# Patient Record
Sex: FEMALE | Race: WHITE | NOT HISPANIC OR LATINO | URBAN - METROPOLITAN AREA
[De-identification: names, ages, dates, MRNs, and addresses within clinical notes are randomized per-mention and may not be internally consistent; named-entity substitution may affect disease eponyms.]

---

## 2021-11-08 ENCOUNTER — APPOINTMENT (RX ONLY)
Dept: URBAN - METROPOLITAN AREA CLINIC 116 | Facility: CLINIC | Age: 36
Setting detail: DERMATOLOGY
End: 2021-11-08

## 2021-11-08 DIAGNOSIS — D49.2 NEOPLASM OF UNSPECIFIED BEHAVIOR OF BONE, SOFT TISSUE, AND SKIN: ICD-10-CM

## 2021-11-08 PROBLEM — D23.72 OTHER BENIGN NEOPLASM OF SKIN OF LEFT LOWER LIMB, INCLUDING HIP: Status: ACTIVE | Noted: 2021-11-08

## 2021-11-08 PROCEDURE — ? COUNSELING

## 2021-11-08 PROCEDURE — 11103 TANGNTL BX SKIN EA SEP/ADDL: CPT

## 2021-11-08 PROCEDURE — 11104 PUNCH BX SKIN SINGLE LESION: CPT

## 2021-11-08 PROCEDURE — ? BIOPSY BY SHAVE METHOD

## 2021-11-08 PROCEDURE — 99202 OFFICE O/P NEW SF 15 MIN: CPT | Mod: 25

## 2021-11-08 PROCEDURE — ? BIOPSY BY PUNCH METHOD

## 2021-11-08 ASSESSMENT — LOCATION ZONE DERM
LOCATION ZONE: LEG
LOCATION ZONE: TRUNK

## 2021-11-08 ASSESSMENT — LOCATION DETAILED DESCRIPTION DERM
LOCATION DETAILED: LEFT PROXIMAL CALF
LOCATION DETAILED: LEFT LATERAL SUPERIOR CHEST

## 2021-11-08 ASSESSMENT — LOCATION SIMPLE DESCRIPTION DERM
LOCATION SIMPLE: CHEST
LOCATION SIMPLE: LEFT CALF

## 2021-11-08 NOTE — PROCEDURE: BIOPSY BY SHAVE METHOD
Detail Level: Detailed
Depth Of Biopsy: dermis
Was A Bandage Applied: Yes
Size Of Lesion In Cm: 0
Biopsy Type: H and E
Biopsy Method: Personna blade
Anesthesia Type: 1% lidocaine with epinephrine and a 1:10 solution of 8.4% sodium bicarbonate
Anesthesia Volume In Cc (Will Not Render If 0): 1
Hemostasis: Lucy's
Wound Care: Vaseline
Dressing: pressure dressing with telfa
Destruction After The Procedure: No
Type Of Destruction Used: Curettage
Curettage Text: The wound bed was treated with curettage after the biopsy was performed.
Cryotherapy Text: The wound bed was treated with cryotherapy after the biopsy was performed.
Electrodesiccation Text: The wound bed was treated with electrodesiccation after the biopsy was performed.
Electrodesiccation And Curettage Text: The wound bed was treated with electrodesiccation and curettage after the biopsy was performed.
Silver Nitrate Text: The wound bed was treated with silver nitrate after the biopsy was performed.
Lab: [facility]
Lab Facility: [address]
Consent: Written consent was obtained and risks were reviewed including but not limited to scarring, infection, bleeding, scabbing, incomplete removal, nerve damage and allergy to anesthesia.
Post-Care Instructions: I reviewed with the patient in detail post-care instructions. Patient is to keep the biopsy site dry overnight, and then apply bacitracin twice daily until healed. Patient may apply hydrogen peroxide soaks to remove any crusting.
Notification Instructions: Patient will be notified of biopsy results. However, patient instructed to call the office if not contacted within 2 weeks.
Billing Type: [employer]
Information: Selecting Yes will display possible errors in your note based on the variables you have selected. This validation is only offered as a suggestion for you. PLEASE NOTE THAT THE VALIDATION TEXT WILL BE REMOVED WHEN YOU FINALIZE YOUR NOTE. IF YOU WANT TO FAX A PRELIMINARY NOTE YOU WILL NEED TO TOGGLE THIS TO 'NO' IF YOU DO NOT WANT IT IN YOUR FAXED NOTE.

## 2021-11-08 NOTE — PROCEDURE: BIOPSY BY PUNCH METHOD
Detail Level: Detailed
Was A Bandage Applied: Yes
Punch Size In Mm: 2
Biopsy Type: H and E
Anesthesia Type: 1% lidocaine with epinephrine
Anesthesia Volume In Cc (Will Not Render If 0): 1
Additional Anesthesia Volume In Cc (Will Not Render If 0): 0
Hemostasis: Aluminum Chloride
Epidermal Sutures: none, closed by secondary intention
Wound Care: Vaseline
Dressing: pressure dressing
Patient Will Remove Sutures At Home?: No
Lab: [facility]
Lab Facility: [address]
Consent: Written consent was obtained and risks were reviewed including but not limited to scarring, infection, bleeding, scabbing, incomplete removal, nerve damage and allergy to anesthesia.
Post-Care Instructions: I reviewed with the patient in detail post-care instructions. Patient is to keep the biopsy site dry overnight, and then apply bacitracin twice daily until healed. Patient may apply hydrogen peroxide soaks to remove any crusting.
Home Suture Removal Text: Patient was provided a home suture removal kit and will remove their sutures at home. If they have any questions or difficulties they will call the office.
Notification Instructions: Patient will be notified of biopsy results. However, patient instructed to call the office if not contacted within 2 weeks.
Billing Type: [employer]
Information: Selecting Yes will display possible errors in your note based on the variables you have selected. This validation is only offered as a suggestion for you. PLEASE NOTE THAT THE VALIDATION TEXT WILL BE REMOVED WHEN YOU FINALIZE YOUR NOTE. IF YOU WANT TO FAX A PRELIMINARY NOTE YOU WILL NEED TO TOGGLE THIS TO 'NO' IF YOU DO NOT WANT IT IN YOUR FAXED NOTE.

## 2022-01-03 ENCOUNTER — APPOINTMENT (RX ONLY)
Dept: URBAN - METROPOLITAN AREA CLINIC 116 | Facility: CLINIC | Age: 37
Setting detail: DERMATOLOGY
End: 2022-01-03

## 2022-01-03 DIAGNOSIS — L91.0 HYPERTROPHIC SCAR: ICD-10-CM

## 2022-01-03 PROCEDURE — ? INTRALESIONAL KENALOG

## 2022-01-03 PROCEDURE — ? COUNSELING

## 2022-01-03 PROCEDURE — 11900 INJECT SKIN LESIONS </W 7: CPT

## 2022-01-03 ASSESSMENT — LOCATION DETAILED DESCRIPTION DERM: LOCATION DETAILED: LEFT LATERAL SUPERIOR CHEST

## 2022-01-03 ASSESSMENT — LOCATION ZONE DERM: LOCATION ZONE: TRUNK

## 2022-01-03 ASSESSMENT — LOCATION SIMPLE DESCRIPTION DERM: LOCATION SIMPLE: CHEST

## 2022-01-03 NOTE — PROCEDURE: INTRALESIONAL KENALOG
Include Z78.9 (Other Specified Conditions Influencing Health Status) As An Associated Diagnosis?: No
X Size Of Lesion In Cm (Optional): 0
Total Volume Injected (Ccs- Only Use Numbers And Decimals): 0.5
Consent: The risks of atrophy were reviewed with the patient.
Medical Necessity Clause: This procedure was medically necessary because the lesions that were treated were:
Lot # (Optional): [id number]
Ndc# For Kenalog Only: 3660-0863-36
Concentration Of Solution Injected (Mg/Ml): 10.0
Detail Level: Simple
Validate Note Data When Using Inventory: Yes
Administered By (Optional): Dillon Elena PA-C
Expiration Date (Optional): March 2023
Kenalog Preparation: Kenalog

## 2022-07-09 ENCOUNTER — TELEPHONE ENCOUNTER (OUTPATIENT)
Dept: URBAN - METROPOLITAN AREA CLINIC 121 | Facility: CLINIC | Age: 37
End: 2022-07-09

## 2022-07-09 RX ORDER — URSODIOL 300 MG/1
ONCE A DAY CAPSULE ORAL ONCE A DAY
Refills: 1 | OUTPATIENT
Start: 2016-06-08 | End: 2017-02-03

## 2022-07-09 RX ORDER — PANCRELIPASE 24000; 76000; 120000 [USP'U]/1; [USP'U]/1; [USP'U]/1
CAPSULE, DELAYED RELEASE PELLETS ORAL ONCE A DAY
Refills: 0 | OUTPATIENT
Start: 2016-05-20 | End: 2017-05-11

## 2022-07-09 RX ORDER — AZITHROMYCIN 250 MG/1
1 TAB 3 TIMES A WEEK TABLET, FILM COATED ORAL
Refills: 0 | OUTPATIENT
Start: 2016-03-30 | End: 2016-05-20

## 2022-07-09 RX ORDER — AMOXICILLIN 500 MG/1
MON WED FRI TABLET, FILM COATED ORAL
Refills: 0 | OUTPATIENT
Start: 2016-11-14 | End: 2017-05-11

## 2022-07-09 RX ORDER — AZITHROMYCIN 250 MG/1
1 TAB 3 TIMES A WEEK TABLET, FILM COATED ORAL
Refills: 0 | OUTPATIENT
Start: 2016-05-20 | End: 2016-06-08

## 2022-07-09 RX ORDER — FLUTICASONE PROPIONATE 50 UG/1
SPRAY, METERED NASAL TWICE A DAY
Refills: 0 | OUTPATIENT
Start: 2017-05-11 | End: 2018-01-04

## 2022-07-09 RX ORDER — AZTREONAM 75 MG/ML
KIT INHALATION ONCE A DAY
Refills: 0 | OUTPATIENT
Start: 2017-09-01 | End: 2018-01-04

## 2022-07-09 RX ORDER — OMEPRAZOLE 20 MG/1
TABLET, DELAYED RELEASE ORAL AS NEEDED
Refills: 0 | OUTPATIENT
Start: 2017-05-11 | End: 2018-01-04

## 2022-07-09 RX ORDER — ALBUTEROL SULFATE 2.5 MG/3ML
SOLUTION RESPIRATORY (INHALATION)
Refills: 0 | OUTPATIENT
Start: 2016-03-30 | End: 2016-05-20

## 2022-07-09 RX ORDER — FLUTICASONE PROPIONATE 50 UG/1
SPRAY, METERED NASAL ONCE A DAY
Refills: 0 | OUTPATIENT
Start: 2016-05-20 | End: 2017-05-11

## 2022-07-09 RX ORDER — ALBUTEROL SULFATE 2.5 MG/3ML
SOLUTION RESPIRATORY (INHALATION)
Refills: 0 | OUTPATIENT
Start: 2012-01-13 | End: 2016-03-30

## 2022-07-09 RX ORDER — URSODIOL 500 MG/1
TABLET ORAL ONCE A DAY
Refills: 0 | OUTPATIENT
Start: 2017-05-11 | End: 2017-06-15

## 2022-07-09 RX ORDER — AZTREONAM 75 MG/ML
KIT INHALATION THREE TIMES A DAY
Refills: 0 | OUTPATIENT
Start: 2017-05-11 | End: 2018-01-04

## 2022-07-09 RX ORDER — ALBUTEROL SULFATE 2.5 MG/3ML
SOLUTION RESPIRATORY (INHALATION) AS NEEDED
Refills: 0 | OUTPATIENT
Start: 2016-05-20 | End: 2017-05-11

## 2022-07-09 RX ORDER — FLUTICASONE PROPIONATE 50 UG/1
SPRAY, METERED NASAL
Refills: 0 | OUTPATIENT
Start: 2016-03-30 | End: 2016-05-20

## 2022-07-09 RX ORDER — FLUTICASONE PROPIONATE AND SALMETEROL 50; 250 UG/1; UG/1
POWDER RESPIRATORY (INHALATION) TWICE A DAY
Refills: 0 | OUTPATIENT
Start: 2017-05-11 | End: 2018-01-04

## 2022-07-09 RX ORDER — AZTREONAM 75 MG/ML
KIT INHALATION
Refills: 0 | OUTPATIENT
Start: 2016-03-30 | End: 2016-05-20

## 2022-07-09 RX ORDER — PANTOPRAZOLE SODIUM 40 MG/1
TABLET, DELAYED RELEASE ORAL ONCE A DAY
Refills: 0 | OUTPATIENT
Start: 2016-05-17 | End: 2016-06-08

## 2022-07-09 RX ORDER — TOBRAMYCIN 300 MG/5ML
SOLUTION ORAL; RESPIRATORY (INHALATION) TWICE A DAY
Refills: 0 | OUTPATIENT
Start: 2017-05-11 | End: 2018-01-04

## 2022-07-09 RX ORDER — SODIUM CHLORIDE 5 %
DROPS OPHTHALMIC (EYE)
Refills: 0 | OUTPATIENT
Start: 2016-03-30 | End: 2016-03-30

## 2022-07-09 RX ORDER — OMEPRAZOLE 20 MG/1
ONCE A DAY CAPSULE, DELAYED RELEASE ORAL ONCE A DAY
Refills: 3 | OUTPATIENT
Start: 2016-08-08 | End: 2016-11-14

## 2022-07-09 RX ORDER — OMEPRAZOLE 20 MG/1
TABLET, DELAYED RELEASE ORAL AS NEEDED
Refills: 0 | OUTPATIENT
Start: 2016-11-14 | End: 2017-05-11

## 2022-07-09 RX ORDER — MORPHINE SULFATE 1 MG/ML
2GM EVERY 30 MINUTESAS NEEDED INJECTION INTRAVENOUS
Refills: 0 | OUTPATIENT
Start: 2016-03-30 | End: 2016-03-30

## 2022-07-09 RX ORDER — AZTREONAM 75 MG/ML
KIT INHALATION
Refills: 0 | OUTPATIENT
Start: 2012-01-13 | End: 2016-03-30

## 2022-07-09 RX ORDER — NITROGLYCERIN 0.4 MG/1
PRN TABLET SUBLINGUAL ONCE A DAY
Refills: 0 | OUTPATIENT
Start: 2016-05-20 | End: 2016-06-08

## 2022-07-09 RX ORDER — AZITHROMYCIN 250 MG
2 TAB BID TABLET ORAL
Refills: 0 | OUTPATIENT
Start: 2016-03-30 | End: 2016-03-30

## 2022-07-09 RX ORDER — HYDROMORPHONE HYDROCHLORIDE 5 MG/5ML
O.5 EVERY 3 HOURS SOLUTION ORAL
Refills: 0 | OUTPATIENT
Start: 2016-03-30 | End: 2016-03-30

## 2022-07-09 RX ORDER — URSODIOL 500 MG/1
TABLET ORAL ONCE A DAY
Refills: 0 | OUTPATIENT
Start: 2017-05-04 | End: 2017-05-11

## 2022-07-09 RX ORDER — AMOXICILLIN 500 MG/1
TABLET, FILM COATED ORAL TWICE A DAY
Refills: 0 | OUTPATIENT
Start: 2016-06-08 | End: 2016-11-14

## 2022-07-09 RX ORDER — PANCRELIPASE 24000; 76000; 120000 [USP'U]/1; [USP'U]/1; [USP'U]/1
CAPSULE, DELAYED RELEASE PELLETS ORAL
Refills: 0 | OUTPATIENT
Start: 2012-01-13 | End: 2016-05-20

## 2022-07-09 RX ORDER — SULFAMETHOXAZOLE/TRIMETHOPRIM 400MG-80MG
TABLET ORAL
Refills: 0 | OUTPATIENT
Start: 2016-03-30 | End: 2016-05-20

## 2022-07-09 RX ORDER — NITROGLYCERIN 0.4 MG/1
PRN TABLET SUBLINGUAL
Refills: 0 | OUTPATIENT
Start: 2016-03-30 | End: 2016-05-20

## 2022-07-09 RX ORDER — LORATADINE 5 MG
TABLET,CHEWABLE ORAL TWICE A DAY
Refills: 0 | OUTPATIENT
Start: 2017-05-11 | End: 2018-01-04

## 2022-07-09 RX ORDER — URSODIOL 300 MG/1
CAPSULE ORAL TWICE A DAY
Refills: 0 | OUTPATIENT
Start: 2016-03-30 | End: 2016-03-30

## 2022-07-09 RX ORDER — AZITHROMYCIN DIHYDRATE 500 MG/1
3 TIMES WEEK TABLET, FILM COATED ORAL
Refills: 0 | OUTPATIENT
Start: 2017-05-11 | End: 2018-01-04

## 2022-07-10 ENCOUNTER — TELEPHONE ENCOUNTER (OUTPATIENT)
Dept: URBAN - METROPOLITAN AREA CLINIC 121 | Facility: CLINIC | Age: 37
End: 2022-07-10

## 2022-07-10 RX ORDER — LACTULOSE 10 G/15ML
T TEASPOON TWICE PER DAY SOLUTION ORAL TWICE A DAY
Refills: 1 | Status: ACTIVE | COMMUNITY
Start: 2012-03-09

## 2022-07-10 RX ORDER — AZTREONAM 75 MG/ML
KIT INHALATION ONCE A DAY
Refills: 0 | Status: ACTIVE | COMMUNITY
Start: 2018-01-04

## 2022-07-10 RX ORDER — FLUTICASONE PROPIONATE 50 UG/1
SPRAY, METERED NASAL TWICE A DAY
Refills: 0 | Status: ACTIVE | COMMUNITY
Start: 2018-01-04

## 2022-07-10 RX ORDER — URSODIOL 500 MG/1
TAKE ONE TABLET ONCE A DAY TABLET ORAL ONCE A DAY
Refills: 0 | Status: ACTIVE | COMMUNITY
Start: 2017-06-15

## 2022-07-10 RX ORDER — PANCRELIPASE 24000; 76000; 120000 [USP'U]/1; [USP'U]/1; [USP'U]/1
CAPSULE, DELAYED RELEASE PELLETS ORAL
Refills: 0 | Status: ACTIVE | COMMUNITY
Start: 2017-05-11

## 2022-07-10 RX ORDER — AZITHROMYCIN DIHYDRATE 500 MG/1
3 TIMES WEEK TABLET, FILM COATED ORAL
Refills: 0 | Status: ACTIVE | COMMUNITY
Start: 2018-01-04

## 2022-07-10 RX ORDER — SUCRALFATE 1 G/1
FOUR TIMES A DAY, 30-60 MINUTES PRIOR TO MEALS AND AT BEDTIME TABLET ORAL
Refills: 0 | Status: ACTIVE | COMMUNITY
Start: 2018-12-06

## 2022-07-10 RX ORDER — OMEPRAZOLE 20 MG/1
TABLET, DELAYED RELEASE ORAL AS NEEDED
Refills: 0 | Status: ACTIVE | COMMUNITY
Start: 2018-01-04

## 2022-07-10 RX ORDER — URSODIOL 300 MG/1
ONCE A DAY CAPSULE ORAL ONCE A DAY
Refills: 2 | Status: ACTIVE | COMMUNITY
Start: 2017-02-03

## 2022-07-10 RX ORDER — ONDANSETRON HYDROCHLORIDE 4 MG/1
TAKE AS DIRECTED- ONE PO EVERY 6 HOURS AS NEEDED FOR NAUSEA TABLET, FILM COATED ORAL TAKE AS DIRECTED
Refills: 0 | Status: ACTIVE | COMMUNITY
Start: 2016-06-08

## 2022-07-10 RX ORDER — SODIUM CHLORIDE FOR INHALATION 7 %
VIAL, NEBULIZER (ML) INHALATION TWICE A DAY
Refills: 0 | Status: ACTIVE | COMMUNITY
Start: 2017-05-11

## 2022-07-10 RX ORDER — OMEPRAZOLE 20 MG/1
ONCE A DAY CAPSULE, DELAYED RELEASE ORAL ONCE A DAY
Refills: 1 | Status: ACTIVE | COMMUNITY
Start: 2016-06-08